# Patient Record
(demographics unavailable — no encounter records)

---

## 2025-04-28 NOTE — END OF VISIT
[FreeTextEntry3] :  I, Dr. Barragan, personally performed the evaluation and management (E/M) services for this new patient.  That E/M includes conducting the clinically appropriate initial history &/or exam, assessing all conditions, and establishing the plan of care.  Today, my DELROY, [employer] Shailesh, was here to observe my evaluation and management service for this patient & follow plan of care established by me going forward.

## 2025-04-28 NOTE — HISTORY OF PRESENT ILLNESS
[FreeTextEntry1] : michele roach is a 35 year M presenting on 04/25/2025 No PMH, No meds Referred by: friend  Trying for couple years. miscarriage at 2 weeks [phone] partner Dulce Phillip, age 39. No female factor No sexual dysfunction thinks his testosterone was checked and was normal MJ and cocaine use. Trying to stop. Cocaine approx weekly. 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

## 2025-04-28 NOTE — PHYSICAL EXAM
[Normal Appearance] : normal appearance [Well Groomed] : well groomed [General Appearance - In No Acute Distress] : no acute distress [Respiration, Rhythm And Depth] : normal respiratory rhythm and effort [Exaggerated Use Of Accessory Muscles For Inspiration] : no accessory muscle use [Urethral Meatus] : meatus normal [Penis Abnormality] : normal uncircumcised penis [Scrotum] : the scrotum was normal [Testes Tenderness] : no tenderness of the testes [Testes Mass (___cm)] : there were no testicular masses [Normal Station and Gait] : the gait and station were normal for the patient's age [] : no rash [No Focal Deficits] : no focal deficits [Oriented To Time, Place, And Person] : oriented to person, place, and time [Affect] : the affect was normal [Mood] : the mood was normal [de-identified] : B/L 20cc testes, no masses. Strong creasteric reflex, difficult to assess varicoceles

## 2025-04-28 NOTE — ASSESSMENT
[FreeTextEntry1] : 34yo M hypogonadism OATs -abstain from substance use -TT, E2, LH, FSH -scrotal sono 3.3 mm left varicocele repeat SA 3 months after DC cocaine marijuana -results by phone

## 2025-04-28 NOTE — HISTORY OF PRESENT ILLNESS
[FreeTextEntry1] : michele roach is a 35 year M presenting on 04/25/2025 No PMH, No meds Referred by: friend  Trying for couple years. miscarriage at 2 weeks [phone] partner Dulce Phillip, age 39. No female factor No sexual dysfunction thinks his testosterone was checked and was normal MJ and cocaine use. Trying to stop. Cocaine approx weekly. 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

## 2025-04-28 NOTE — PHYSICAL EXAM
[Normal Appearance] : normal appearance [Well Groomed] : well groomed [General Appearance - In No Acute Distress] : no acute distress [Respiration, Rhythm And Depth] : normal respiratory rhythm and effort [Exaggerated Use Of Accessory Muscles For Inspiration] : no accessory muscle use [Urethral Meatus] : meatus normal [Penis Abnormality] : normal uncircumcised penis [Scrotum] : the scrotum was normal [Testes Tenderness] : no tenderness of the testes [Testes Mass (___cm)] : there were no testicular masses [Normal Station and Gait] : the gait and station were normal for the patient's age [] : no rash [No Focal Deficits] : no focal deficits [Oriented To Time, Place, And Person] : oriented to person, place, and time [Affect] : the affect was normal [Mood] : the mood was normal [de-identified] : B/L 20cc testes, no masses. Strong creasteric reflex, difficult to assess varicoceles

## 2025-04-28 NOTE — END OF VISIT
[FreeTextEntry3] :  I, Dr. Barragan, personally performed the evaluation and management (E/M) services for this new patient.  That E/M includes conducting the clinically appropriate initial history &/or exam, assessing all conditions, and establishing the plan of care.  Today, my DELROY, [employer] Shailesh, was here to observe my evaluation and management service for this patient & follow plan of care established by me going forward.